# Patient Record
Sex: MALE | Race: BLACK OR AFRICAN AMERICAN
[De-identification: names, ages, dates, MRNs, and addresses within clinical notes are randomized per-mention and may not be internally consistent; named-entity substitution may affect disease eponyms.]

---

## 2020-06-25 ENCOUNTER — HOSPITAL ENCOUNTER (EMERGENCY)
Dept: HOSPITAL 88 - ER | Age: 30
Discharge: HOME | End: 2020-06-25
Payer: COMMERCIAL

## 2020-06-25 VITALS — SYSTOLIC BLOOD PRESSURE: 133 MMHG | DIASTOLIC BLOOD PRESSURE: 76 MMHG

## 2020-06-25 VITALS — BODY MASS INDEX: 27.38 KG/M2 | HEIGHT: 61 IN | WEIGHT: 145 LBS

## 2020-06-25 DIAGNOSIS — F20.9: Primary | ICD-10-CM

## 2020-06-25 PROCEDURE — 99284 EMERGENCY DEPT VISIT MOD MDM: CPT

## 2020-06-25 NOTE — EMERGENCY DEPARTMENT NOTE
History of Present Illnes


History of Present Illness


Chief Complaint:  Psychiatric


History of Present Illness


This is a 30 year old  male arrived to the ED with complaints s

tating and was hearing voices. Patient states he often hears voices and talks to

them but felt a little bit more anxious than usual. Patient states he feels 

fine, doesn't have a desire to hurt himself. 





Chief Complaint Comment Patient in from [facility] which is a psych 

facility via EMS with reports of just feeling "off" today. Patient states that 

he woke up this morning and felt normal but as time progressed he started 

feeling increasingly anxious and nervous. Patient reports that he has been diagn

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 

Patient reports having prophetic dreams and states he has conversations with 

interdimensional beings of various races that are here as angels to guide 

humanity. Patient denies Suicidal or homicidal ideation at this time. Patient 

states that he is complaint with his medications and frequently goes to [facility] for therapy and group meetings. Patient states he wishes to be an 

[place] .


Historian:  Patient, Paramedic/EMS


Arrival Mode:  [place]


Onset (how long ago):  hour(s)


Severity:  mild


Onset quality:  gradual


Duration (how long):  day(s)


Timing of current episode:  intermittent


Progression:  resolved


Chronicity:  recurrent





Past Medical/Family History


Physician Review


I have reviewed the patient's past medical and family history.  Any updates have

been documented here.





Past Medical History


Recent Fever:  No


Clinical Suspicion of Infectio:  No


New/Unexplained Change in Ment:  No


Past Medical History:  Anxiety, Other Mental Illness


Other Medical History:  


schizo-effective disorder


Past Surgical History:  None





Other


Last Tetanus:  Unknown





Physical Exam


Related Data


Allergies:  


Coded Allergies:  


     divalproex sodium (Verified  Allergy, Intermediate, shakes, 6/25/20)


     haloperidol (Verified  Allergy, Intermediate, shakes, 6/25/20)


Triage Vital Signs





Vital Signs








  Date Time  Temp Pulse Resp B/P (MAP) Pulse Ox O2 Delivery O2 Flow Rate FiO2


 


6/25/20 12:25 98.3 68 19 116/84 99   








Vital signs reviewed:  Yes





Physical Exam


CONSTITUTIONAL





Constitutional:  Present well-developed, Present well-nourished


HENT


HENT:  Present normocephalic, Present atraumatic, Present oropharynx clear/carmelo

st, Present nose normal


HENT L/R:  Present left ext ear normal, Present right ext ear normal


EYES





Eyes:  Reports PERRL, Reports conjunctivae normal


NECK


Neck:  Present ROM normal


PULMONARY


Pulmonary:  Present effort normal, Present breath sounds normal


CARDIOVASCULAR





Cardiovascular:  Present regular rhythm, Present heart sounds normal, Present 

capillary refill normal, Present normal rate


GASTROINTESTINAL





Abdominal:  Present soft, Present nontender, Present bowel sounds normal


GENITOURINARY





Genitourinary:  Present exam deferred


SKIN


Skin:  Present warm, Present dry


MUSCULOSKELETAL





Musculoskeletal:  Present ROM normal


NEUROLOGICAL





Neurological:  Present alert, Present oriented x 3, Present no gross motor or 

sensory deficits


PSYCHOLOGICAL


Psychological:  Present mood/affect normal, Present judgement normal





Assessment & Plan


Medical Decision Making


MDM


30-year-old well-appearing male arrives to the ED with complaints of visual 

auditory hallucinations that lasted for a few minutes and resolved. Patient 

states he suffers from these recurrently however felt a little anxious this time

in light was going on Covid 19. Patient denies any desire to hurt himself or 

anyone else. Patient states heis diagnosis of underlying schizophrenia and is on

medication which he is compliant with. Patient has outpatient psychiatric 

follow-up, did not pose a harm to himself and the ED. Patient has normal insight

and judgment. Patient stable for discharge home.





Assessment & Plan


Final Impression:  


(1) Schizophrenia


Depart Disposition:  HOME, SELF-CARE


Last Vital Signs











  Date Time  Temp Pulse Resp B/P (MAP) Pulse Ox O2 Delivery O2 Flow Rate FiO2


 


6/25/20 12:25 98.3 68 19 116/84 99   

















TASHA BERNAL DO            Jun 25, 2020 15:33